# Patient Record
Sex: MALE | Race: WHITE | NOT HISPANIC OR LATINO | Employment: OTHER | URBAN - METROPOLITAN AREA
[De-identification: names, ages, dates, MRNs, and addresses within clinical notes are randomized per-mention and may not be internally consistent; named-entity substitution may affect disease eponyms.]

---

## 2024-01-01 ENCOUNTER — APPOINTMENT (OUTPATIENT)
Dept: CARDIOLOGY | Facility: HOSPITAL | Age: 84
End: 2024-01-01
Payer: MEDICARE

## 2024-01-01 ENCOUNTER — HOSPITAL ENCOUNTER (INPATIENT)
Facility: HOSPITAL | Age: 84
LOS: 1 days | End: 2024-07-02
Attending: STUDENT IN AN ORGANIZED HEALTH CARE EDUCATION/TRAINING PROGRAM | Admitting: EMERGENCY MEDICINE
Payer: MEDICARE

## 2024-01-01 ENCOUNTER — APPOINTMENT (OUTPATIENT)
Dept: RADIOLOGY | Facility: HOSPITAL | Age: 84
End: 2024-01-01
Payer: MEDICARE

## 2024-01-01 VITALS
OXYGEN SATURATION: 80 % | SYSTOLIC BLOOD PRESSURE: 50 MMHG | RESPIRATION RATE: 22 BRPM | DIASTOLIC BLOOD PRESSURE: 30 MMHG | BODY MASS INDEX: 26.98 KG/M2 | WEIGHT: 178 LBS | HEIGHT: 68 IN | HEART RATE: 70 BPM | TEMPERATURE: 94.9 F

## 2024-01-01 DIAGNOSIS — I21.3 STEMI (ST ELEVATION MYOCARDIAL INFARCTION) (MULTI): ICD-10-CM

## 2024-01-01 DIAGNOSIS — I21.02 ST ELEVATION (STEMI) MYOCARDIAL INFARCTION INVOLVING LEFT ANTERIOR DESCENDING CORONARY ARTERY (MULTI): ICD-10-CM

## 2024-01-01 DIAGNOSIS — R07.9 CHEST PAIN, UNSPECIFIED TYPE: Primary | ICD-10-CM

## 2024-01-01 DIAGNOSIS — R79.89 ELEVATED TROPONIN: ICD-10-CM

## 2024-01-01 LAB
ACT BLD: 258 SEC (ref 89–169)
ALBUMIN SERPL BCP-MCNC: 4.4 G/DL (ref 3.4–5)
ALP SERPL-CCNC: 68 U/L (ref 33–136)
ALT SERPL W P-5'-P-CCNC: 13 U/L (ref 10–52)
ANION GAP SERPL CALC-SCNC: 13 MMOL/L (ref 10–20)
AST SERPL W P-5'-P-CCNC: 14 U/L (ref 9–39)
ATRIAL RATE: 73 BPM
ATRIAL RATE: 74 BPM
BASOPHILS # BLD AUTO: 0.04 X10*3/UL (ref 0–0.1)
BASOPHILS NFR BLD AUTO: 0.9 %
BILIRUB SERPL-MCNC: 0.6 MG/DL (ref 0–1.2)
BNP SERPL-MCNC: 122 PG/ML (ref 0–99)
BUN SERPL-MCNC: 14 MG/DL (ref 6–23)
CALCIUM SERPL-MCNC: 9.4 MG/DL (ref 8.6–10.3)
CARDIAC TROPONIN I PNL SERPL HS: 33 NG/L (ref 0–20)
CHLORIDE SERPL-SCNC: 106 MMOL/L (ref 98–107)
CO2 SERPL-SCNC: 23 MMOL/L (ref 21–32)
CREAT SERPL-MCNC: 1.28 MG/DL (ref 0.5–1.3)
EGFRCR SERPLBLD CKD-EPI 2021: 55 ML/MIN/1.73M*2
EOSINOPHIL # BLD AUTO: 0.17 X10*3/UL (ref 0–0.4)
EOSINOPHIL NFR BLD AUTO: 3.9 %
ERYTHROCYTE [DISTWIDTH] IN BLOOD BY AUTOMATED COUNT: 14.9 % (ref 11.5–14.5)
GLUCOSE SERPL-MCNC: 173 MG/DL (ref 74–99)
HCT VFR BLD AUTO: 43.4 % (ref 41–52)
HGB BLD-MCNC: 14.7 G/DL (ref 13.5–17.5)
IMM GRANULOCYTES # BLD AUTO: 0.01 X10*3/UL (ref 0–0.5)
IMM GRANULOCYTES NFR BLD AUTO: 0.2 % (ref 0–0.9)
INR PPP: 1 (ref 0.9–1.1)
LYMPHOCYTES # BLD AUTO: 1.65 X10*3/UL (ref 0.8–3)
LYMPHOCYTES NFR BLD AUTO: 38 %
MAGNESIUM SERPL-MCNC: 2.05 MG/DL (ref 1.6–2.4)
MCH RBC QN AUTO: 30 PG (ref 26–34)
MCHC RBC AUTO-ENTMCNC: 33.9 G/DL (ref 32–36)
MCV RBC AUTO: 89 FL (ref 80–100)
MONOCYTES # BLD AUTO: 0.67 X10*3/UL (ref 0.05–0.8)
MONOCYTES NFR BLD AUTO: 15.4 %
NEUTROPHILS # BLD AUTO: 1.8 X10*3/UL (ref 1.6–5.5)
NEUTROPHILS NFR BLD AUTO: 41.6 %
NRBC BLD-RTO: 0 /100 WBCS (ref 0–0)
P AXIS: 68 DEGREES
P AXIS: 76 DEGREES
P OFFSET: 182 MS
P OFFSET: 193 MS
P ONSET: 116 MS
P ONSET: 131 MS
PLATELET # BLD AUTO: 162 X10*3/UL (ref 150–450)
POTASSIUM SERPL-SCNC: 4 MMOL/L (ref 3.5–5.3)
PR INTERVAL: 172 MS
PR INTERVAL: 180 MS
PROT SERPL-MCNC: 7.6 G/DL (ref 6.4–8.2)
PROTHROMBIN TIME: 11.3 SECONDS (ref 9.8–12.8)
Q ONSET: 202 MS
Q ONSET: 221 MS
QRS COUNT: 12 BEATS
QRS COUNT: 12 BEATS
QRS DURATION: 104 MS
QRS DURATION: 110 MS
QT INTERVAL: 422 MS
QT INTERVAL: 424 MS
QTC CALCULATION(BAZETT): 464 MS
QTC CALCULATION(BAZETT): 470 MS
QTC FREDERICIA: 450 MS
QTC FREDERICIA: 455 MS
R AXIS: -46 DEGREES
R AXIS: -68 DEGREES
RBC # BLD AUTO: 4.9 X10*6/UL (ref 4.5–5.9)
SODIUM SERPL-SCNC: 138 MMOL/L (ref 136–145)
T AXIS: 3 DEGREES
T AXIS: 66 DEGREES
T OFFSET: 414 MS
T OFFSET: 432 MS
VENTRICULAR RATE: 73 BPM
VENTRICULAR RATE: 74 BPM
WBC # BLD AUTO: 4.3 X10*3/UL (ref 4.4–11.3)

## 2024-01-01 PROCEDURE — 84075 ASSAY ALKALINE PHOSPHATASE: CPT | Performed by: REGISTERED NURSE

## 2024-01-01 PROCEDURE — 93454 CORONARY ARTERY ANGIO S&I: CPT | Performed by: INTERNAL MEDICINE

## 2024-01-01 PROCEDURE — 2500000004 HC RX 250 GENERAL PHARMACY W/ HCPCS (ALT 636 FOR OP/ED)

## 2024-01-01 PROCEDURE — 36415 COLL VENOUS BLD VENIPUNCTURE: CPT | Performed by: REGISTERED NURSE

## 2024-01-01 PROCEDURE — 2500000002 HC RX 250 W HCPCS SELF ADMINISTERED DRUGS (ALT 637 FOR MEDICARE OP, ALT 636 FOR OP/ED): Performed by: REGISTERED NURSE

## 2024-01-01 PROCEDURE — 2500000005 HC RX 250 GENERAL PHARMACY W/O HCPCS

## 2024-01-01 PROCEDURE — 99285 EMERGENCY DEPT VISIT HI MDM: CPT

## 2024-01-01 PROCEDURE — G0278 ILIAC ART ANGIO,CARDIAC CATH: HCPCS | Performed by: INTERNAL MEDICINE

## 2024-01-01 PROCEDURE — 93463 DRUG ADMIN & HEMODYNMIC MEAS: CPT | Performed by: INTERNAL MEDICINE

## 2024-01-01 PROCEDURE — 71045 X-RAY EXAM CHEST 1 VIEW: CPT | Mod: FOREIGN READ | Performed by: RADIOLOGY

## 2024-01-01 PROCEDURE — 96375 TX/PRO/DX INJ NEW DRUG ADDON: CPT | Mod: 59

## 2024-01-01 PROCEDURE — 2500000004 HC RX 250 GENERAL PHARMACY W/ HCPCS (ALT 636 FOR OP/ED): Performed by: INTERNAL MEDICINE

## 2024-01-01 PROCEDURE — 1200000002 HC GENERAL ROOM WITH TELEMETRY DAILY

## 2024-01-01 PROCEDURE — 2500000005 HC RX 250 GENERAL PHARMACY W/O HCPCS: Performed by: INTERNAL MEDICINE

## 2024-01-01 PROCEDURE — 3E033XZ INTRODUCTION OF VASOPRESSOR INTO PERIPHERAL VEIN, PERCUTANEOUS APPROACH: ICD-10-PCS | Performed by: EMERGENCY MEDICINE

## 2024-01-01 PROCEDURE — 83880 ASSAY OF NATRIURETIC PEPTIDE: CPT | Performed by: REGISTERED NURSE

## 2024-01-01 PROCEDURE — 93005 ELECTROCARDIOGRAM TRACING: CPT

## 2024-01-01 PROCEDURE — 99222 1ST HOSP IP/OBS MODERATE 55: CPT | Performed by: NURSE PRACTITIONER

## 2024-01-01 PROCEDURE — 99152 MOD SED SAME PHYS/QHP 5/>YRS: CPT | Performed by: INTERNAL MEDICINE

## 2024-01-01 PROCEDURE — 85610 PROTHROMBIN TIME: CPT | Performed by: REGISTERED NURSE

## 2024-01-01 PROCEDURE — 85025 COMPLETE CBC W/AUTO DIFF WBC: CPT | Performed by: REGISTERED NURSE

## 2024-01-01 PROCEDURE — 85347 COAGULATION TIME ACTIVATED: CPT | Performed by: INTERNAL MEDICINE

## 2024-01-01 PROCEDURE — C1769 GUIDE WIRE: HCPCS | Performed by: INTERNAL MEDICINE

## 2024-01-01 PROCEDURE — 2550000001 HC RX 255 CONTRASTS: Performed by: INTERNAL MEDICINE

## 2024-01-01 PROCEDURE — 71045 X-RAY EXAM CHEST 1 VIEW: CPT

## 2024-01-01 PROCEDURE — 96374 THER/PROPH/DIAG INJ IV PUSH: CPT | Mod: 59

## 2024-01-01 PROCEDURE — 85347 COAGULATION TIME ACTIVATED: CPT

## 2024-01-01 PROCEDURE — 96361 HYDRATE IV INFUSION ADD-ON: CPT | Mod: 59

## 2024-01-01 PROCEDURE — 99153 MOD SED SAME PHYS/QHP EA: CPT | Performed by: INTERNAL MEDICINE

## 2024-01-01 PROCEDURE — B2111ZZ FLUOROSCOPY OF MULTIPLE CORONARY ARTERIES USING LOW OSMOLAR CONTRAST: ICD-10-PCS | Performed by: INTERNAL MEDICINE

## 2024-01-01 PROCEDURE — 99291 CRITICAL CARE FIRST HOUR: CPT | Performed by: EMERGENCY MEDICINE

## 2024-01-01 PROCEDURE — 2500000004 HC RX 250 GENERAL PHARMACY W/ HCPCS (ALT 636 FOR OP/ED): Performed by: REGISTERED NURSE

## 2024-01-01 PROCEDURE — 84484 ASSAY OF TROPONIN QUANT: CPT | Performed by: REGISTERED NURSE

## 2024-01-01 PROCEDURE — 4A023N8 MEASUREMENT OF CARDIAC SAMPLING AND PRESSURE, BILATERAL, PERCUTANEOUS APPROACH: ICD-10-PCS | Performed by: INTERNAL MEDICINE

## 2024-01-01 PROCEDURE — 05HY33Z INSERTION OF INFUSION DEVICE INTO UPPER VEIN, PERCUTANEOUS APPROACH: ICD-10-PCS | Performed by: REGISTERED NURSE

## 2024-01-01 PROCEDURE — C1887 CATHETER, GUIDING: HCPCS | Performed by: INTERNAL MEDICINE

## 2024-01-01 PROCEDURE — 2720000007 HC OR 272 NO HCPCS: Performed by: INTERNAL MEDICINE

## 2024-01-01 PROCEDURE — 83735 ASSAY OF MAGNESIUM: CPT | Performed by: REGISTERED NURSE

## 2024-01-01 RX ORDER — METOPROLOL TARTRATE 25 MG/1
12.5 TABLET, FILM COATED ORAL 2 TIMES DAILY
Status: DISCONTINUED | OUTPATIENT
Start: 2024-01-01 | End: 2024-01-01 | Stop reason: HOSPADM

## 2024-01-01 RX ORDER — ASPIRIN 81 MG/1
81 TABLET ORAL DAILY
Status: DISCONTINUED | OUTPATIENT
Start: 2024-07-03 | End: 2024-01-01 | Stop reason: HOSPADM

## 2024-01-01 RX ORDER — SODIUM CHLORIDE 9 MG/ML
50 INJECTION, SOLUTION INTRAVENOUS CONTINUOUS
Status: ACTIVE | OUTPATIENT
Start: 2024-01-01 | End: 2024-01-01

## 2024-01-01 RX ORDER — NOREPINEPHRINE BITARTRATE/D5W 8 MG/250ML
.01-.2 PLASTIC BAG, INJECTION (ML) INTRAVENOUS CONTINUOUS
Status: DISCONTINUED | OUTPATIENT
Start: 2024-01-01 | End: 2024-01-01 | Stop reason: HOSPADM

## 2024-01-01 RX ORDER — PHENYLEPHRINE HYDROCHLORIDE 10 MG/ML
INJECTION INTRAVENOUS AS NEEDED
Status: DISCONTINUED | OUTPATIENT
Start: 2024-01-01 | End: 2024-01-01 | Stop reason: HOSPADM

## 2024-01-01 RX ORDER — NOREPINEPHRINE BITARTRATE 1 MG/ML
INJECTION, SOLUTION INTRAVENOUS AS NEEDED
Status: DISCONTINUED | OUTPATIENT
Start: 2024-01-01 | End: 2024-01-01 | Stop reason: HOSPADM

## 2024-01-01 RX ORDER — HEPARIN SODIUM 5000 [USP'U]/ML
4000 INJECTION, SOLUTION INTRAVENOUS; SUBCUTANEOUS ONCE
Status: COMPLETED | OUTPATIENT
Start: 2024-01-01 | End: 2024-01-01

## 2024-01-01 RX ORDER — NOREPINEPHRINE BITARTRATE/D5W 8 MG/250ML
PLASTIC BAG, INJECTION (ML) INTRAVENOUS
Status: DISCONTINUED
Start: 2024-01-01 | End: 2024-01-01 | Stop reason: HOSPADM

## 2024-01-01 RX ORDER — NAPROXEN SODIUM 220 MG/1
324 TABLET, FILM COATED ORAL ONCE
Status: DISCONTINUED | OUTPATIENT
Start: 2024-01-01 | End: 2024-01-01

## 2024-01-01 RX ORDER — HEPARIN SODIUM 1000 [USP'U]/ML
INJECTION, SOLUTION INTRAVENOUS; SUBCUTANEOUS AS NEEDED
Status: DISCONTINUED | OUTPATIENT
Start: 2024-01-01 | End: 2024-01-01 | Stop reason: HOSPADM

## 2024-01-01 RX ORDER — LIDOCAINE HYDROCHLORIDE 20 MG/ML
INJECTION, SOLUTION INFILTRATION; PERINEURAL AS NEEDED
Status: DISCONTINUED | OUTPATIENT
Start: 2024-01-01 | End: 2024-01-01 | Stop reason: HOSPADM

## 2024-01-01 RX ORDER — MORPHINE SULFATE 2 MG/ML
INJECTION, SOLUTION INTRAMUSCULAR; INTRAVENOUS
Status: COMPLETED
Start: 2024-01-01 | End: 2024-01-01

## 2024-01-01 SDOH — SOCIAL STABILITY: SOCIAL INSECURITY: ARE THERE ANY APPARENT SIGNS OF INJURIES/BEHAVIORS THAT COULD BE RELATED TO ABUSE/NEGLECT?: NO

## 2024-01-01 SDOH — ECONOMIC STABILITY: INCOME INSECURITY
IN THE LAST 12 MONTHS, WAS THERE A TIME WHEN YOU WERE NOT ABLE TO PAY THE MORTGAGE OR RENT ON TIME?: PATIENT UNABLE TO ANSWER

## 2024-01-01 SDOH — SOCIAL STABILITY: SOCIAL INSECURITY: HAVE YOU HAD THOUGHTS OF HARMING ANYONE ELSE?: NO

## 2024-01-01 SDOH — HEALTH STABILITY: MENTAL HEALTH
STRESS IS WHEN SOMEONE FEELS TENSE, NERVOUS, ANXIOUS, OR CAN'T SLEEP AT NIGHT BECAUSE THEIR MIND IS TROUBLED. HOW STRESSED ARE YOU?: PATIENT UNABLE TO ANSWER

## 2024-01-01 SDOH — SOCIAL STABILITY: SOCIAL INSECURITY: DO YOU FEEL UNSAFE GOING BACK TO THE PLACE WHERE YOU ARE LIVING?: NO

## 2024-01-01 SDOH — ECONOMIC STABILITY: HOUSING INSECURITY
IN THE LAST 12 MONTHS, WAS THERE A TIME WHEN YOU DID NOT HAVE A STEADY PLACE TO SLEEP OR SLEPT IN A SHELTER (INCLUDING NOW)?: PATIENT UNABLE TO ANSWER

## 2024-01-01 SDOH — SOCIAL STABILITY: SOCIAL INSECURITY: HAVE YOU HAD ANY THOUGHTS OF HARMING ANYONE ELSE?: NO

## 2024-01-01 SDOH — SOCIAL STABILITY: SOCIAL NETWORK: ARE YOU MARRIED, WIDOWED, DIVORCED, SEPARATED, NEVER MARRIED, OR LIVING WITH A PARTNER?: PATIENT UNABLE TO ANSWER

## 2024-01-01 SDOH — SOCIAL STABILITY: SOCIAL INSECURITY: WERE YOU ABLE TO COMPLETE ALL THE BEHAVIORAL HEALTH SCREENINGS?: YES

## 2024-01-01 SDOH — SOCIAL STABILITY: SOCIAL INSECURITY: HAS ANYONE EVER THREATENED TO HURT YOUR FAMILY OR YOUR PETS?: NO

## 2024-01-01 SDOH — ECONOMIC STABILITY: TRANSPORTATION INSECURITY
IN THE PAST 12 MONTHS, HAS THE LACK OF TRANSPORTATION KEPT YOU FROM MEDICAL APPOINTMENTS OR FROM GETTING MEDICATIONS?: PATIENT UNABLE TO ANSWER

## 2024-01-01 SDOH — SOCIAL STABILITY: SOCIAL NETWORK: HOW OFTEN DO YOU ATTEND CHURCH OR RELIGIOUS SERVICES?: PATIENT UNABLE TO ANSWER

## 2024-01-01 SDOH — SOCIAL STABILITY: SOCIAL INSECURITY: ABUSE: ADULT

## 2024-01-01 SDOH — ECONOMIC STABILITY: TRANSPORTATION INSECURITY
IN THE PAST 12 MONTHS, HAS LACK OF TRANSPORTATION KEPT YOU FROM MEETINGS, WORK, OR FROM GETTING THINGS NEEDED FOR DAILY LIVING?: PATIENT UNABLE TO ANSWER

## 2024-01-01 SDOH — SOCIAL STABILITY: SOCIAL NETWORK: HOW OFTEN DO YOU GET TOGETHER WITH FRIENDS OR RELATIVES?: PATIENT UNABLE TO ANSWER

## 2024-01-01 SDOH — SOCIAL STABILITY: SOCIAL NETWORK: IN A TYPICAL WEEK, HOW MANY TIMES DO YOU TALK ON THE PHONE WITH FAMILY, FRIENDS, OR NEIGHBORS?: PATIENT UNABLE TO ANSWER

## 2024-01-01 SDOH — SOCIAL STABILITY: SOCIAL INSECURITY: DO YOU FEEL ANYONE HAS EXPLOITED OR TAKEN ADVANTAGE OF YOU FINANCIALLY OR OF YOUR PERSONAL PROPERTY?: NO

## 2024-01-01 SDOH — SOCIAL STABILITY: SOCIAL INSECURITY: DOES ANYONE TRY TO KEEP YOU FROM HAVING/CONTACTING OTHER FRIENDS OR DOING THINGS OUTSIDE YOUR HOME?: NO

## 2024-01-01 SDOH — ECONOMIC STABILITY: INCOME INSECURITY
HOW HARD IS IT FOR YOU TO PAY FOR THE VERY BASICS LIKE FOOD, HOUSING, MEDICAL CARE, AND HEATING?: PATIENT UNABLE TO ANSWER

## 2024-01-01 SDOH — SOCIAL STABILITY: SOCIAL NETWORK: HOW OFTEN DO YOU ATTENT MEETINGS OF THE CLUB OR ORGANIZATION YOU BELONG TO?: PATIENT UNABLE TO ANSWER

## 2024-01-01 SDOH — SOCIAL STABILITY: SOCIAL INSECURITY: ARE YOU OR HAVE YOU BEEN THREATENED OR ABUSED PHYSICALLY, EMOTIONALLY, OR SEXUALLY BY ANYONE?: NO

## 2024-01-01 SDOH — ECONOMIC STABILITY: FOOD INSECURITY
WITHIN THE PAST 12 MONTHS, YOU WORRIED THAT YOUR FOOD WOULD RUN OUT BEFORE YOU GOT MONEY TO BUY MORE.: PATIENT UNABLE TO ANSWER

## 2024-01-01 SDOH — SOCIAL STABILITY: SOCIAL NETWORK
DO YOU BELONG TO ANY CLUBS OR ORGANIZATIONS SUCH AS CHURCH GROUPS UNIONS, FRATERNAL OR ATHLETIC GROUPS, OR SCHOOL GROUPS?: PATIENT UNABLE TO ANSWER

## 2024-01-01 SDOH — ECONOMIC STABILITY: FOOD INSECURITY
WITHIN THE PAST 12 MONTHS, THE FOOD YOU BOUGHT JUST DIDN'T LAST AND YOU DIDN'T HAVE MONEY TO GET MORE.: PATIENT UNABLE TO ANSWER

## 2024-01-01 ASSESSMENT — ACTIVITIES OF DAILY LIVING (ADL)
HEARING - LEFT EAR: DIFFICULTY WITH NOISE
ADEQUATE_TO_COMPLETE_ADL: YES
GROOMING: INDEPENDENT
DRESSING YOURSELF: INDEPENDENT
FEEDING YOURSELF: INDEPENDENT
BATHING: INDEPENDENT
HEARING - RIGHT EAR: DIFFICULTY WITH NOISE
TOILETING: INDEPENDENT
JUDGMENT_ADEQUATE_SAFELY_COMPLETE_DAILY_ACTIVITIES: YES
PATIENT'S MEMORY ADEQUATE TO SAFELY COMPLETE DAILY ACTIVITIES?: YES
WALKS IN HOME: INDEPENDENT

## 2024-01-01 ASSESSMENT — PATIENT HEALTH QUESTIONNAIRE - PHQ9
1. LITTLE INTEREST OR PLEASURE IN DOING THINGS: NOT AT ALL
2. FEELING DOWN, DEPRESSED OR HOPELESS: NOT AT ALL
SUM OF ALL RESPONSES TO PHQ9 QUESTIONS 1 & 2: 0

## 2024-01-01 ASSESSMENT — PAIN DESCRIPTION - ORIENTATION: ORIENTATION: MID

## 2024-01-01 ASSESSMENT — COGNITIVE AND FUNCTIONAL STATUS - GENERAL
MOVING FROM LYING ON BACK TO SITTING ON SIDE OF FLAT BED WITH BEDRAILS: A LOT
WALKING IN HOSPITAL ROOM: A LOT
TURNING FROM BACK TO SIDE WHILE IN FLAT BAD: A LITTLE
MOVING TO AND FROM BED TO CHAIR: A LITTLE
TOILETING: A LITTLE
DAILY ACTIVITIY SCORE: 18
EATING MEALS: A LITTLE
DRESSING REGULAR UPPER BODY CLOTHING: A LITTLE
STANDING UP FROM CHAIR USING ARMS: A LITTLE
MOBILITY SCORE: 15
PATIENT BASELINE BEDBOUND: NO
CLIMB 3 TO 5 STEPS WITH RAILING: A LOT
PERSONAL GROOMING: A LITTLE
HELP NEEDED FOR BATHING: A LITTLE
DRESSING REGULAR LOWER BODY CLOTHING: A LITTLE

## 2024-01-01 ASSESSMENT — LIFESTYLE VARIABLES
AUDIT-C TOTAL SCORE: -1
HAVE YOU EVER FELT YOU SHOULD CUT DOWN ON YOUR DRINKING: NO
EVER HAD A DRINK FIRST THING IN THE MORNING TO STEADY YOUR NERVES TO GET RID OF A HANGOVER: NO
HOW OFTEN DO YOU HAVE 6 OR MORE DRINKS ON ONE OCCASION: PATIENT UNABLE TO ANSWER
EVER FELT BAD OR GUILTY ABOUT YOUR DRINKING: NO
SKIP TO QUESTIONS 9-10: 0
HAVE PEOPLE ANNOYED YOU BY CRITICIZING YOUR DRINKING: NO
AUDIT-C TOTAL SCORE: -1
HOW MANY STANDARD DRINKS CONTAINING ALCOHOL DO YOU HAVE ON A TYPICAL DAY: PATIENT UNABLE TO ANSWER
HOW OFTEN DO YOU HAVE A DRINK CONTAINING ALCOHOL: PATIENT UNABLE TO ANSWER
TOTAL SCORE: 0

## 2024-01-01 ASSESSMENT — PAIN DESCRIPTION - LOCATION: LOCATION: CHEST

## 2024-01-01 ASSESSMENT — PAIN SCALES - GENERAL
PAINLEVEL_OUTOF10: 8
PAINLEVEL_OUTOF10: 0 - NO PAIN
PAINLEVEL_OUTOF10: 8

## 2024-01-01 ASSESSMENT — PAIN - FUNCTIONAL ASSESSMENT
PAIN_FUNCTIONAL_ASSESSMENT: 0-10
PAIN_FUNCTIONAL_ASSESSMENT: 0-10

## 2024-01-01 ASSESSMENT — PAIN DESCRIPTION - DESCRIPTORS: DESCRIPTORS: DULL

## 2024-07-02 PROBLEM — R07.9 CHEST PAIN, UNSPECIFIED TYPE: Status: ACTIVE | Noted: 2024-01-01

## 2024-07-02 PROBLEM — J44.9 COPD (CHRONIC OBSTRUCTIVE PULMONARY DISEASE) (MULTI): Status: ACTIVE | Noted: 2024-01-01

## 2024-07-02 PROBLEM — Z85.89 HISTORY OF SQUAMOUS CELL CARCINOMA: Status: ACTIVE | Noted: 2017-11-21

## 2024-07-02 PROBLEM — E78.5 HYPERLIPIDEMIA: Status: ACTIVE | Noted: 2019-04-22

## 2024-07-02 NOTE — CARE PLAN
Problem: ACS/CP/NSTEMI/STEMI  Goal: Chest pain managed (free from pain or at acceptable level)  Outcome: Not Progressing  Goal: Lab values return to normal range  Outcome: Not Progressing  Goal: Promote self management  Outcome: Not Progressing  Goal: Serial ECG will return to baseline  Outcome: Not Progressing  Goal: Verbalize understanding of procedures/devices  Outcome: Not Progressing  Goal: Wean vasopressors/achieve hemodynamic stability  Outcome: Not Progressing     Problem: Cardiac catheterization  Goal: Free from dysrhythmias  Outcome: Not Progressing  Goal: Free from pain  Outcome: Not Progressing  Goal: No evidence of post procedure complications  Outcome: Not Progressing  Goal: Promote self management  Outcome: Not Progressing  Goal: Verbalize understanding of procedure  Outcome: Not Progressing  Goal: Care and maintenance of device (specify)  Outcome: Not Progressing

## 2024-07-02 NOTE — CONSULTS
Consults    Cardiology Consult Note      Date:   7/2/2024  Patient name:  Griffin Becerril  Date of admission:  7/2/2024  9:23 AM  MRN:   [id number]  YOB: 1940  Time of Consult:  11:28 AM    Consulting Cardiologist: MAI Quintanilla, CNP  Primary Cardiologist:   None     Referring Provider: Emergency department physician      Admission Diagnosis:     Chest pain    History of Present Illness:      Griffin Becerril is a 83 y.o.  male patient who is being at the request of Dr. Wendy CNP for inpatient consultation of  STEMI . He was admitted on 7/2/2024.  No previous [facility] heart cardiology records.  [facility] EMR records were reviewed to obtain pertinent past medical/surgical history.  Patient with history of COPD, diverticulosis, hypertension, hyperlipidemia, prostate cancer, skin cancer, hemorrhoids who presented to [facility] emergency department this morning with chief complaint of chest pain.  Evaluation in the emergency department showed patient to be diaphoretic and pale as well as hypotension with systolic blood pressures in the 90s.  He received fluid boluses and was started on norepinephrine infusion.  Serial EKG's performed revealed sinus rhythm with diffuse ST changes.  Initial troponin was 33, , creatinine 1.28 with GFR 55.  Repeat electrocardiogram continued to show ST changes therefore STEMI protocol was activated and patient was brought to the cardiac catheterization lab.  He is found to have severe triple-vessel disease with occluded native coronary arteries and aneurysmal disease.  He was transferred to the ICU for further care.      Allergies:     No Known Allergies      Past Medical History:     See above    Past Surgical History:     Appendectomy, hemorrhoidectomy, brachytherapy, skin surgery, cataract surgery    Family History:     No family history on file.    Social History:          CURRENT  INPATIENT MEDICATIONS    [START ON 7/3/2024] aspirin, 81 mg, oral, Daily  metoprolol tartrate, 12.5 mg, oral, BID      EPINEPHrine, , Last Rate: 0.02 mcg/kg/min (07/02/24 1051)  norepinephrine, 0.01-0.2 mcg/kg/min, Last Rate: 0.06 mcg/kg/min (07/02/24 1116)  sodium chloride 0.9%, 50 mL/hr      No current outpatient medications     Review of Systems:      Limited due to urgent nature of procedure    Vital Signs:     Vitals:    07/02/24 1033 07/02/24 1038 07/02/24 1039 07/02/24 1040   BP:   (!) 72/46    BP Location:       Patient Position:       Pulse: 78  77 84   Resp: 18  18 20   Temp:    34.9 °C (94.9 °F)   TempSrc:       SpO2:  99% 100% (!) 73%   Weight:       Height:         No intake or output data in the 24 hours ending 07/02/24 1128    Wt Readings from Last 4 Encounters:   07/02/24 80.7 kg (178 lb)       Physical Examination:     Limited due to acute nature of procedure  Well-developed elderly male, pale with altered level of consciousness.  Heart sounds regular S1 and S2.  Skin cool to touch    Lab:     CBC:   Results from last 7 days   Lab Units 07/02/24  0955   WBC AUTO x10*3/uL 4.3*   RBC AUTO x10*6/uL 4.90   HEMOGLOBIN g/dL 14.7   HEMATOCRIT % 43.4   MCV fL 89   MCH pg 30.0   MCHC g/dL 33.9   RDW % 14.9*   PLATELETS AUTO x10*3/uL 162     CMP:    Results from last 7 days   Lab Units 07/02/24  0955   SODIUM mmol/L 138   POTASSIUM mmol/L 4.0   CHLORIDE mmol/L 106   CO2 mmol/L 23   BUN mg/dL 14   CREATININE mg/dL 1.28   GLUCOSE mg/dL 173*   PROTEIN TOTAL g/dL 7.6   CALCIUM mg/dL 9.4   BILIRUBIN TOTAL mg/dL 0.6   ALK PHOS U/L 68   AST U/L 14   ALT U/L 13     BMP:    Results from last 7 days   Lab Units 07/02/24  0955   SODIUM mmol/L 138   POTASSIUM mmol/L 4.0   CHLORIDE mmol/L 106   CO2 mmol/L 23   BUN mg/dL 14   CREATININE mg/dL 1.28   CALCIUM mg/dL 9.4   GLUCOSE mg/dL 173*     Magnesium:  Results from last 7 days   Lab Units 07/02/24  0955   MAGNESIUM mg/dL 2.05     Troponin:    Results from last 7 days    Lab Units 07/02/24  0955   TROPHS ng/L 33*     BNP:   Results from last 7 days   Lab Units 07/02/24  0955   BNP pg/mL 122*     Lipid Panel:         Diagnostic Studies:   @No results found for this or any previous visit.    XR chest 1 view    Result Date: 7/2/2024  STUDY: Chest Radiograph;  07/02/2024, 10:05AM INDICATION: Chest pain. COMPARISON: None Available ACCESSION NUMBER(S): [id number] ORDERING CLINICIAN: DULCE MARIA QUINN TECHNIQUE:  Frontal chest was obtained at 10:05 hours. FINDINGS: CARDIOMEDIASTINAL SILHOUETTE: Cardiomediastinal silhouette is normal in size and configuration.  LUNGS: There are interstitial changes in both lungs.  These may be chronic but without old films acute infiltrates cannot be excluded.  ABDOMEN: No remarkable upper abdominal findings.  BONES: No acute osseous changes.    Increased bilateral interstitial change. Signed by Mushtaq Vance MD    ECG 12 lead    Result Date: 7/2/2024  Normal sinus rhythm Left axis deviation Low voltage QRS Right bundle branch block Septal infarct , age undetermined Marked ST abnormality, possible inferior subendocardial injury Abnormal ECG When compared with ECG of 02-JUL-2024 09:40, (unconfirmed) Significant changes have occurred See ED provider note for full interpretation and clinical correlation    ECG 12 lead    Result Date: 7/2/2024  Sinus rhythm with Premature supraventricular complexes Left axis deviation Incomplete right bundle branch block Nonspecific ST abnormality Abnormal ECG No previous ECGs available See ED provider note for full interpretation and clinical correlation      No echocardiogram results found for the past 14 days    Radiology:     XR chest 1 view   Final Result   Increased bilateral interstitial change.   Signed by Mushtaq Vance MD      Cardiac Catheterization Procedure    (Results Pending)       Problem List:     Chest pain      Assessment:     Chest pain, NSTEMI  Severe, diffuse triple-vessel coronary artery disease s/p  urgent cardiac catheterization  Peripheral arterial disease per cardiac catheterization  History of hypertension currently hypotensive    Plan:     Admit to critical care service with cardiology consult  Dr. Vallecillo recommends conservative medical management and spoke to patient's wife to inform her of findings and his recommendation, she prefers comfort care, DNR status.  Discussed with intensive care physician and acute care NP    Imelda Harrison CNP  [facility]      Of note, this documentation is completed using the Dragon Dictation system (voice recognition software). There may be spelling and/or grammatical errors that were not corrected prior to final submission.      Electronically signed by MATT Mansfield, on 7/2/2024 at 11:28 AM

## 2024-07-02 NOTE — Clinical Note
Sheath was left in place in the right femoral vein. Site secured by suture and transparent dressing.

## 2024-07-02 NOTE — NURSING NOTE
Call out to [employer] ([phone]) to ensure aware of patient passing. No answer- on hold for over 10 mins

## 2024-07-02 NOTE — DISCHARGE SUMMARY
Discharge Diagnosis  Chest pain, unspecified type    Issues Requiring Follow-Up  NA Patient     Test Results Pending At Discharge  Pending Labs       Order Current Status    Troponin I Series, High Sensitivity (0, 1 HR) In process            Hospital Course   Griffin Becerril is a 83 y.o. year old male patient with Past Medical History of hypertension, hyperlipidemia who presented with weakness and fatigue.  Was activated as a STEMI alert in the emergency department.  Taken emergently to the Cath Lab where severe three-vessel coronary artery disease was found.  Given patient's severe vascular disease, mechanical circulatory support was not an option.  No revascularization was able to be undertaken.  Case was discussed with patient's wife by Dr. Vallecillo and patient made a DNR.     Transferred to the ICU in cardiogenic shock for further care.        Interval ICU Events:     : Admitted to the ICU in cardiogenic shock s/p STEMI. Patient arrived from the Cath Lab in cardiogenic shock.  Discussed case with Dr. Vallecillo.  No acute intervention available.  Patient already made a DNR.  Discussed with patient's wife.  Shortly thereafter arrival patient went unresponsive, agonal he breathing, PEA arrest, time of death pronounced at 11:50 AM.       Pertinent Physical Exam At Time of Discharge  Physical Exam    Home Medications     Medication List      You have not been prescribed any medications.       Outpatient Follow-Up  No future appointments.    Unruly Velasco,

## 2024-07-02 NOTE — POST-PROCEDURE NOTE
Physician Transition of Care Summary  Invasive Cardiovascular Lab    Procedure Date: 7/2/2024  Attending:    * Po Vallecillo - Primary  Resident/Fellow/Other Assistant: Surgeons and Role:  * No surgeons found with a matching role *    Pre Procedure Diagnosis:   Acute anterior STEMI    Post Procedure Diagnosis:   Severe three-vessel CAD manifested by 99% ostial LAD, large aneurysm of proximal LAD, 100% mid LAD, 95% proximal circumflex, large aneurysm of the mid circumflex, 100% distal circumflex, and 100% mid RCA, severe atherosclerotic disease and tortuosity of the aorta all iliac vessels and the common femoral arteries bilaterally, no option for left ventricular assist device, no option for PCI or CABG, discussed case with the patient's wife and have decided to make patient DNR    Complications:   None    Stents/Implants:   None    Anticoagulation/Antiplatelet Plan:   None    Estimated Blood Loss:   0 mL    Electronically signed by: Po Vallecillo MD, 7/2/2024 11:54 AM    Anesthesia: Moderate                            anesthesia Staff: None

## 2024-07-02 NOTE — SIGNIFICANT EVENT
11:45 Patient arrived to MICU 10 from cath lab and connected to monitors. Levophed running at 0.08 mcg/kg/min. and epinephrine running at 0.02 mcg/kg/min    11: 50 Patient assessed upon arrival from cath lab. During neuro assessment patient answering questions appropriately and then began to stare off/go unresponsive , vomiting clear fluid, and turned red in the face. At this time NP was called to the bedside, patient had no pulse and was reading PEA (per NP). Wife at bedside and aware of situation.

## 2024-07-02 NOTE — SIGNIFICANT EVENT
Called to bedside by nursing    On arrival patient unresponsive to voice and painful stimuli  Pupils fixed  Absent heart sounds  No spontaneous respirations     Time of Death: 1150AM    Family Notified.

## 2024-07-02 NOTE — H&P
"[facility] Critical Care Medicine       Date:  7/2/2024  Patient:  Griffin Becerril  YOB: 1940  MRN:  [id number]   Admit Date:  7/2/2024  ========================================================================================================    Chief Complaint   Patient presents with    Chest Pain     CP from home; started this morning while watching tv         History of Present Illness:  Griffin Becerril is a 83 y.o. year old male patient with Past Medical History of hypertension, hyperlipidemia who presented with weakness and fatigue.  Was activated as a STEMI alert in the emergency department.  Taken emergently to the Cath Lab where severe three-vessel coronary artery disease was found.  Given patient's severe vascular disease, mechanical circulatory support was not an option.  No revascularization was able to be undertaken.  Case was discussed with patient's wife by Dr. Vallecillo and patient made a DNR.    Transferred to the ICU in cardiogenic shock for further care.      Interval ICU Events:    7/2: Admitted to the ICU in cardiogenic shock s/p STEMI.    Medical History:  History reviewed. No pertinent past medical history.  History reviewed. No pertinent surgical history.  No medications prior to admission.     Patient has no known allergies.     No family history on file.    Review of Systems:  14 point review of systems was completed and negative except for those specially mention in my HPI    Physical Exam:    Heart Rate:  [68-84]   Temp:  [29.9 °C (85.8 °F)-34.9 °C (94.9 °F)]   Resp:  [18-20]   BP: (72-90)/(46-60)   Height:  [172.7 cm (5' 8\")]   Weight:  [80.7 kg (178 lb)]   SpO2:  [73 %-100 %]     Physical Exam  Vitals and nursing note reviewed.   Constitutional:       General: He is in acute distress.      Appearance: He is ill-appearing and diaphoretic.   HENT:      Head: Normocephalic and atraumatic.      Nose: Nose normal.      Mouth/Throat:      Mouth: Mucous membranes are dry. "   Cardiovascular:      Rate and Rhythm: Normal rate and regular rhythm.   Pulmonary:      Effort: Pulmonary effort is normal. No respiratory distress.   Abdominal:      General: Abdomen is flat. There is no distension.      Tenderness: There is no abdominal tenderness.   Musculoskeletal:         General: No swelling.   Skin:     Capillary Refill: Capillary refill takes more than 3 seconds.      Coloration: Skin is pale.      Comments: Cool cyanotic    Neurological:      Mental Status: He is oriented to person, place, and time.         Objective:    I have reviewed all medications, laboratory results, and imaging pertinent for today's encounter    Assessment/Plan:    I am currently managing this critically ill patient for the following problems:    Cardiogenic shock s/p anterior STEMI  Acute hypoxic respiratory failure    Plan:  Patient arrived from the Cath Lab in cardiogenic shock.  Discussed case with Dr. Vallecillo.  No acute intervention available.  Patient already made a DNR.  Discussed with patient's wife.  Shortly thereafter arrival patient went unresponsive, agonal he breathing, PEA arrest, time of death pronounced at 11:50 AM.    Critical Care Time:  60 min    Unruly Velasco DO

## 2024-07-02 NOTE — Clinical Note
Sheath was left in place in the left femoral artery. Site secured by suture and transparent dressing.

## 2024-07-02 NOTE — ED PROVIDER NOTES
HPI   Chief Complaint   Patient presents with    Chest Pain     CP from home; started this morning while watching tv         History provided by:  Patient   used: No      83-year-old male with past medical history significant for hypertension, COPD, and hyperlipidemia presents emergency department today for evaluation of chest pain.  Patient tells me about 7 or 8:00 this morning he started having left-sided chest pain that he characterizes as dull.  Patient tells me for approximately 1 year he has been feeling an overall sensation of fatigue and shortness of breath with exertion.  Patient called EMS this morning who gave him IV fentanyl.  They report that his systolic BP was in the 70s on their arrival.  Patient tells me that he started to feel nauseated with onset of chest pain as well.  Patient denies any abdominal pain.  Patient denies history of cardiac stents.  Patient tells me he takes a baby aspirin daily.  Patient does not have a cardiologist that he will need to the primary care provider.                  M[place] Coma Scale Score: 15                     Patient History   No past medical history on file.  No past surgical history on file.  No family history on file.  Social History     Tobacco Use    Smoking status: Not on file    Smokeless tobacco: Not on file   Substance Use Topics    Alcohol use: Not on file    Drug use: Not on file       Physical Exam   ED Triage Vitals [07/02/24 0926]   Temperature Heart Rate Respirations BP   (!) 29.9 °C (85.8 °F) 68 18 90/60      Pulse Ox Temp Source Heart Rate Source Patient Position   97 % Temporal Monitor Lying      BP Location FiO2 (%)     Right arm --       Physical Exam  Vitals and nursing note reviewed.   Constitutional:       General: He is in acute distress.      Appearance: He is well-developed. He is ill-appearing and diaphoretic.   Cardiovascular:      Rate and Rhythm: Normal rate.   Pulmonary:      Breath sounds: Wheezing present.    Skin:     General: Skin is warm.      Capillary Refill: Capillary refill takes less than 2 seconds.   Neurological:      General: No focal deficit present.      Mental Status: He is alert and oriented to person, place, and time.         ED Course & MDM        Medical Decision Making  Patient seen examined emergency department; patient is diaphoretic and pale.  Patient is lung sounds are clear with an expiratory wheeze.  Patient's heart regular rate and rhythm without any murmur noted.  Patient has good peripheral pulses and no peripheral edema.    Will do chest x-ray, and serial troponins to rule out acute ACS.  Patient has a systolic BP in the 90s therefore a liter of fluids was ordered.  Will also order CBC, CMP, BNP to evaluate for electrolyte abnormalities or fluid overload contributing to patient's symptoms.  Will defer from pain medications at this time as patient with soft blood pressures.  Patient given a 500 cc fluid bolus initially waiting for chest x-ray to rule out fluid overload.    EKG at 09:18 with ventricular rate of 56, as interpreted by me, shows a sinus bradycardia, normal axis, normal intervals. And ST elevation in aVL as well as ST depression noted.    Repeat EKG at 09:40 with ventricular rate of 73, as interpreted by me, shows a normal rate and rhythm, normal axis, normal intervals. And nonspecific ST abnormality with no evidence of acute ischemia or other acute findings    0951 -patient discussed with Dr. Vallecillo who is on-call for interventional radiology.  While he does agree that there is ST elevation and depression noted on patient's EKG he does not believe this is STEMI criteria at this time.  Will call back with troponins or any changes in patient's condition.    On my review patient's x-ray does not appear to be with pulmonary edema therefore another 500 cc bolus was given    Patient continues to be hypotensive despite IV fluids with a systolic BP of 69.  Therefore Levophed was  ordered.    Repeat EKG at 10:19 with ventricular rate of 74, as interpreted by me, shows a normal rate and rhythm, normal axis, normal intervals. Marked ST elevation.     1023 -repeat EKG with marked ST elevation therefore I reached out to Dr. Vallecillo who agrees at this time patient needs to come to the Cath Lab as a STEMI.  Heparin, Brilinta, and aspirin ordered.    Labs Reviewed   CBC WITH AUTO DIFFERENTIAL - Abnormal       Result Value    WBC 4.3 (*)     nRBC 0.0      RBC 4.90      Hemoglobin 14.7      Hematocrit 43.4      MCV 89      MCH 30.0      MCHC 33.9      RDW 14.9 (*)     Platelets 162      Neutrophils % 41.6      Immature Granulocytes %, Automated 0.2      Lymphocytes % 38.0      Monocytes % 15.4      Eosinophils % 3.9      Basophils % 0.9      Neutrophils Absolute 1.80      Immature Granulocytes Absolute, Automated 0.01      Lymphocytes Absolute 1.65      Monocytes Absolute 0.67      Eosinophils Absolute 0.17      Basophils Absolute 0.04     COMPREHENSIVE METABOLIC PANEL - Abnormal    Glucose 173 (*)     Sodium 138      Potassium 4.0      Chloride 106      Bicarbonate 23      Anion Gap 13      Urea Nitrogen 14      Creatinine 1.28      eGFR 55 (*)     Calcium 9.4      Albumin 4.4      Alkaline Phosphatase 68      Total Protein 7.6      AST 14      Bilirubin, Total 0.6      ALT 13     B-TYPE NATRIURETIC PEPTIDE - Abnormal     (*)     Narrative:        <100 pg/mL - Heart failure unlikely  100-299 pg/mL - Intermediate probability of acute heart                  failure exacerbation. Correlate with clinical                  context and patient history.    >=300 pg/mL - Heart Failure likely. Correlate with clinical                  context and patient history.    BNP testing is performed using different testing methodology at [facility] than at other [facility] hospitals. Direct result comparisons should only be made within the same method.      SERIAL TROPONIN-INITIAL - Abnormal     Troponin I, High Sensitivity 33 (*)     Narrative:     Less than 99th percentile of normal range cutoff-  Female and children under 18 years old <14 ng/L; Male <21 ng/L: Negative  Repeat testing should be performed if clinically indicated.     Female and children under 18 years old 14-50 ng/L; Male 21-50 ng/L:  Consistent with possible cardiac damage and possible increased clinical   risk. Serial measurements may help to assess extent of myocardial damage.     >50 ng/L: Consistent with cardiac damage, increased clinical risk and  myocardial infarction. Serial measurements may help assess extent of   myocardial damage.      NOTE: Children less than 1 year old may have higher baseline troponin   levels and results should be interpreted in conjunction with the overall   clinical context.     NOTE: Troponin I testing is performed using a different   testing methodology at [facility] than at other   [facility]. Direct result comparisons should only   be made within the same method.   MAGNESIUM - Normal    Magnesium 2.05     PROTIME-INR - Normal    Protime 11.3      INR 1.0     TROPONIN SERIES- (INITIAL, 1 HR)    Narrative:     The following orders were created for panel order Troponin I Series, High Sensitivity (0, 1 HR).  Procedure                               Abnormality         Status                     ---------                               -----------         ------                     Troponin I, High Sensiti...[[id number]]  Abnormal            Final result               Troponin, High Sensitivi...[[id number]]                                                   Please view results for these tests on the individual orders.   SERIAL TROPONIN, 1 HOUR       XR chest 1 view    (Results Pending)   Cardiac Catheterization Procedure    (Results Pending)         Procedure  Procedures     Shi Harris, MAI-MILADYS  07/02/24 1038

## 2024-07-03 RX ORDER — MORPHINE SULFATE 2 MG/ML
2 INJECTION, SOLUTION INTRAMUSCULAR; INTRAVENOUS ONCE
Status: COMPLETED | OUTPATIENT
Start: 2024-01-01 | End: 2024-01-01

## 2024-07-11 LAB
ATRIAL RATE: 73 BPM
ATRIAL RATE: 74 BPM
P AXIS: 68 DEGREES
P AXIS: 76 DEGREES
P OFFSET: 182 MS
P OFFSET: 193 MS
P ONSET: 116 MS
P ONSET: 131 MS
PR INTERVAL: 172 MS
PR INTERVAL: 180 MS
Q ONSET: 202 MS
Q ONSET: 221 MS
QRS COUNT: 12 BEATS
QRS COUNT: 12 BEATS
QRS DURATION: 104 MS
QRS DURATION: 110 MS
QT INTERVAL: 422 MS
QT INTERVAL: 424 MS
QTC CALCULATION(BAZETT): 464 MS
QTC CALCULATION(BAZETT): 470 MS
QTC FREDERICIA: 450 MS
QTC FREDERICIA: 455 MS
R AXIS: -46 DEGREES
R AXIS: -68 DEGREES
T AXIS: 3 DEGREES
T AXIS: 66 DEGREES
T OFFSET: 414 MS
T OFFSET: 432 MS
VENTRICULAR RATE: 73 BPM
VENTRICULAR RATE: 74 BPM

## (undated) DEVICE — CATHETER, GUIDING, VISTA BRITE 6FR, XB 3.5 100CM

## (undated) DEVICE — SHEATH, PINNACLE, W/.038 GUIDEWIRE, 10 CM,  6FR INTRODUCER, 6FR DIA, 2.5 CM DIALATOR

## (undated) DEVICE — Device

## (undated) DEVICE — CATHETER, INFINITI DIAGNOSTIC, 5 FR 100CM 3DRC, WILLIAMS RIGHT OR NO TORQUE

## (undated) DEVICE — INFLATION DEVICE, COPILOT VALVE AND 20/30 INDEFLATOR

## (undated) DEVICE — TUBING, MANIFOLD, LOW PRESSURE

## (undated) DEVICE — BANDAGE, QUIKCLOT, INTERVENTIONAL HEMO, W/O SLIT

## (undated) DEVICE — GUIDEWIRE, TIGERWIRE, FLOPPY, ANGLED, 150CM

## (undated) DEVICE — GUIDEWIRE, INQWIRE, 3MM J, .035 X 210CM, FIXED

## (undated) DEVICE — SHEATH, PINNACLE, W/.038 GW 10CM, 5FR INTRODUCER, 2.5 CM DIALATOR

## (undated) DEVICE — GUIDEWIRE, UNIVERSAL BALANCE MID WEIGHT, 190CM, STR